# Patient Record
Sex: MALE | Race: WHITE | Employment: OTHER | URBAN - METROPOLITAN AREA
[De-identification: names, ages, dates, MRNs, and addresses within clinical notes are randomized per-mention and may not be internally consistent; named-entity substitution may affect disease eponyms.]

---

## 2021-05-25 ENCOUNTER — TELEPHONE (OUTPATIENT)
Dept: ORTHOPEDIC SURGERY | Age: 86
End: 2021-05-25

## 2021-05-25 NOTE — TELEPHONE ENCOUNTER
Appointment Request     Patient requesting earlier appointment: Yes  Appointment offered to patient: 07/02/2021  Patient Contact Number: [phone]      Patient states first two fingers arm numb and he has burning sensation in hand.

## 2021-06-10 ENCOUNTER — OFFICE VISIT (OUTPATIENT)
Dept: ORTHOPEDIC SURGERY | Age: 86
End: 2021-06-10

## 2021-06-10 VITALS — HEIGHT: 64 IN | RESPIRATION RATE: 16 BRPM | WEIGHT: 214 LBS | BODY MASS INDEX: 36.54 KG/M2

## 2021-06-10 DIAGNOSIS — G56.03 CARPAL TUNNEL SYNDROME, BILATERAL: Primary | ICD-10-CM

## 2021-06-10 PROCEDURE — 99203 OFFICE O/P NEW LOW 30 MIN: CPT | Performed by: ORTHOPAEDIC SURGERY

## 2021-06-10 NOTE — PROGRESS NOTES
This 80 y.o., right hand dominant retired man is seen in referral for Riley Soto MD with a chief complaint of numbness and tingling of the bilateral hands, R>L. Symptoms have been present for several years. The patient also frequently notices pain in the hand, wrist and arm, as well as weakness of , morning stiffness and swelling as well as difficulty performing functions which require fine touch. Symptoms are sometimes worse at night and can disturb sleep. The problem appears to recently have become worse. Conservative treatment has not been prescribed. There is no history of wrist fracture. There is no history and/or family history of diabetes. There is history of thyroid disease. There is no family history of carpal tunnel syndrome. The pain assessment has been reviewed and is correct as stated. The patient's social history, past medical history, family history, medications, allergies and review of systems, entered 6/10/21, have been reviewed, and dated and are recorded in the chart. On examination the patient is Height: 5' 4\" (162.6 cm) tall and weighs Weight: 214 lb (97.1 kg). Respirations are 18 per minute. The patient is well nourished, is oriented to time and place, demonstrates appropriate mood, mild dementia, as well as normal gait and station. Cervical range of motion is normal for the patient's stated age and does not produce pain or paresthesias in either upper extremity. There is soft tissue swelling involving the volar aspect of the bilateral wrist.  There is severe bilateral thenar muscle atrophy. The Tinel sign is positive over the median nerve at the  carpal tunnel bilaterally and negative over the ulnar nerve at the elbow bilaterally. The Phalen test is positive on the right at 0 seconds. There is hypalgesia, with associated dysesthesia, on sensory testing over the median nerve distribution.   Two point discrimination is abnormal at the tip of the right middle finger. Range of motion of the fingers, thumbs and wrists is normal.  Skin is intact without lesions. Distal circulation is normal.  All joints are stable. Fine motor coordination and gross muscle strength are normal. Deep tendon reflexes are brisk and present bilaterally. There are no subcutaneous masses or enlarged epitrochlear lymph nodes. I have personally reviewed and interpreted all previous external imaging studies, laboratory tests, diagnostic proceedures and medical encounters pertinent to this patient's visit today. Review and independent interpretation of an external EMG study, dated 5/7/21 , preformed by Dr. Cee Brown, a physician outside of this office, is abnormal. There is severe right and moderately severe left carpal tunnel syndrome. The test results are shared with the patient and his wife. Impression:     Carpal tunnel syndrome, bilateral, severe, R>L, with clinical  evidence of nerve damage. The nature of this medical problem is fully discussed with the patient and his wife, including all treatment options. All questions are answered. Surgery is also fully discussed including risks, prognosis and post op care. They seem unable to make a decision regarding treatment. Therefore I recommended to them that their son, who is a pharmacist, call me to discuss this problem and medical treatment options.

## 2021-06-14 ENCOUNTER — TELEPHONE (OUTPATIENT)
Dept: ORTHOPEDIC SURGERY | Age: 86
End: 2021-06-14

## 2021-06-22 ENCOUNTER — TELEPHONE (OUTPATIENT)
Dept: ORTHOPEDIC SURGERY | Age: 86
End: 2021-06-22

## 2021-07-23 RX ORDER — TORSEMIDE 20 MG/1
20 TABLET ORAL 2 TIMES DAILY
COMMUNITY

## 2021-07-23 RX ORDER — LEVOTHYROXINE SODIUM 0.03 MG/1
25 TABLET ORAL DAILY
COMMUNITY

## 2021-07-23 RX ORDER — MONTELUKAST SODIUM 10 MG/1
10 TABLET ORAL NIGHTLY
COMMUNITY

## 2021-07-23 RX ORDER — METHSCOPOLAMINE BROMIDE 2.5 MG/1
2.5 TABLET ORAL DAILY
COMMUNITY

## 2021-07-23 RX ORDER — METOPROLOL SUCCINATE 25 MG/1
25 TABLET, EXTENDED RELEASE ORAL 2 TIMES DAILY
COMMUNITY

## 2021-07-23 RX ORDER — PANTOPRAZOLE SODIUM 40 MG/1
40 TABLET, DELAYED RELEASE ORAL DAILY
COMMUNITY

## 2021-07-23 RX ORDER — ESCITALOPRAM OXALATE 10 MG/1
10 TABLET ORAL DAILY
COMMUNITY

## 2021-07-23 RX ORDER — LORATADINE 10 MG/1
10 TABLET ORAL DAILY
COMMUNITY

## 2021-07-23 RX ORDER — FENOFIBRATE 160 MG/1
160 TABLET ORAL DAILY
COMMUNITY

## 2021-07-23 NOTE — PROGRESS NOTES
[facility] time__0815__________        Surgery time____________    Take the following medications with a sip of water: Follow your Doctor's pre procedure instructions regarding medications  Do not eat or drink anything after 12:00 midnight prior to your surgery. This includes water chewing gum, mints and ice chips. You may brush your teeth and gargle the morning of your surgery, but do not swallow the water     Please see your family doctor/pediatrician for a history and physical and/or concerning medications. Bring any test results/reports from your physicians office. If you are under the care of a heart doctor or specialist doctor, please be aware that you may be asked to them for clearance    You may be asked to stop blood thinners such as Coumadin, Plavix, Fragmin, Lovenox, etc., or any anti-inflammatories such as:  Aspirin, Ibuprofen, Advil, Naproxen prior to your surgery. We also ask that you stop any OTC medications such as fish oil, vitamin E, glucosamine, garlic, Multivitamins, COQ 10, etc.    We ask that you do not smoke 24 hours prior to surgery  We ask that you do not  drink any alcoholic beverages 24 hours prior to surgery     You must make arrangements for a responsible adult to take you home after your surgery. For your safety you will not be allowed to leave alone or drive yourself home. Your surgery will be cancelled if you do not have a ride home. Also for your safety, it is strongly suggested that someone stay with you the first 24 hours after your surgery. A parent or legal guardian must accompany a child scheduled for surgery and plan to stay at the hospital until the child is discharged. Please do not bring other children with you. For your comfort, please wear simple loose fitting clothing to the hospital.  Please do not bring valuables.     Do not wear any make-up or nail polish on your fingers or toes      For your safety, please do not wear any jewelry or body piercing's on the day of surgery. All jewelry must be removed. If you have dentures, they will be removed before going to operating room. For your convenience, we will provide you with a container. If you wear contact lenses or glasses, they will be removed, please bring a case for them. If you have a living will and a durable power of  for healthcare, please bring in a copy. As part of our patient safety program to minimize surgical site infections, we ask you to do the following:    · Please notify your surgeon if you develop any illness between         now and the  day of your surgery. · This includes a cough, cold, fever, sore throat, nausea,         or vomiting, and diarrhea, etc.  ·  Please notify your surgeon if you experience dizziness, shortness         of breath or blurred vision between now and the time of your surgery. Do not shave your operative site 96 hours prior to surgery. For face and neck surgery, men may use an electric razor 48 hours   prior to surgery. You may shower the night before surgery or the morning of   your surgery with an antibacterial soap. You will need to bring a photo ID and insurance card    [facility] has an onsite pharmacy, would you like to utilize our pharmacy     If you will be staying overnight and use a C-pap machine, please bring   your C-pap to hospital     Our goal is to provide you with excellent care, therefore, visitors will be limited to two(2) in the room at a time so that we may focus on providing this care for you. Please contact pre-admission testing if you have any further questions. [facility] phone number:  [address] PAT fax number:  [phone]  Please note these are generalized instructions for all surgical cases, you may be provided with more specific instructions according to your surgery.     Preoperative Screening for Elective Surgery/Invasive Procedures While COVID-19 present in the community     Have you tested positive or have been told to self-isolate for COVID-19 like symptoms within the past 28 days? no   Do you currently have any of the following symptoms?no  o Fever >100.0 F or 99.9 F in immunocompromised patients? o New onset cough, shortness of breath or difficulty breathing?  o New onset sore throat, myalgia (muscle aches and pains), headache, loss of taste/smell or diarrhea?  Have you had a potential exposure to COVID-19 within the past 14 days by:  o Close contact with a confirmed case? o Close contact with a healthcare worker,  or essential infrastructure worker (grocery store, [employer], gas station, public utilities or transportation)? o Do you reside in a congregate setting such as; skilled nursing facility, adult home, correctional facility, homeless shelter or other institutional setting?  o Have you had recent travel to a known COVID-19 hotspot? Indicate if the patient has a positive screen by answering yes to one or more of the above questions. Patients who test positive or screen positive prior to surgery or on the day of surgery should be evaluated in conjunction with the surgeon/proceduralist/anesthesiologist to determine the urgency of the procedure. SAFETY FIRST. .call before you fall

## 2021-07-26 ENCOUNTER — ANESTHESIA EVENT (OUTPATIENT)
Dept: OPERATING ROOM | Age: 86
End: 2021-07-26
Payer: MEDICARE

## 2021-07-27 ENCOUNTER — ANESTHESIA (OUTPATIENT)
Dept: OPERATING ROOM | Age: 86
End: 2021-07-27
Payer: MEDICARE

## 2021-07-27 ENCOUNTER — HOSPITAL ENCOUNTER (OUTPATIENT)
Age: 86
Setting detail: OUTPATIENT SURGERY
Discharge: HOME | End: 2021-07-27
Attending: ORTHOPAEDIC SURGERY | Admitting: ORTHOPAEDIC SURGERY
Payer: MEDICARE

## 2021-07-27 VITALS — OXYGEN SATURATION: 99 % | SYSTOLIC BLOOD PRESSURE: 116 MMHG | DIASTOLIC BLOOD PRESSURE: 56 MMHG

## 2021-07-27 VITALS
HEIGHT: 64 IN | RESPIRATION RATE: 16 BRPM | SYSTOLIC BLOOD PRESSURE: 120 MMHG | BODY MASS INDEX: 36.38 KG/M2 | OXYGEN SATURATION: 94 % | HEART RATE: 68 BPM | DIASTOLIC BLOOD PRESSURE: 61 MMHG | WEIGHT: 213.07 LBS | TEMPERATURE: 98 F

## 2021-07-27 PROCEDURE — 3700000000 HC ANESTHESIA ATTENDED CARE: Performed by: ORTHOPAEDIC SURGERY

## 2021-07-27 PROCEDURE — 3700000001 HC ADD 15 MINUTES (ANESTHESIA): Performed by: ORTHOPAEDIC SURGERY

## 2021-07-27 PROCEDURE — 2709999900 HC NON-CHARGEABLE SUPPLY: Performed by: ORTHOPAEDIC SURGERY

## 2021-07-27 PROCEDURE — 2580000003 HC RX 258: Performed by: ORTHOPAEDIC SURGERY

## 2021-07-27 PROCEDURE — 7100000010 HC PHASE II RECOVERY - FIRST 15 MIN: Performed by: ORTHOPAEDIC SURGERY

## 2021-07-27 PROCEDURE — 7100000011 HC PHASE II RECOVERY - ADDTL 15 MIN: Performed by: ORTHOPAEDIC SURGERY

## 2021-07-27 PROCEDURE — 6360000002 HC RX W HCPCS

## 2021-07-27 PROCEDURE — 7100000001 HC PACU RECOVERY - ADDTL 15 MIN: Performed by: ORTHOPAEDIC SURGERY

## 2021-07-27 PROCEDURE — 7100000000 HC PACU RECOVERY - FIRST 15 MIN: Performed by: ORTHOPAEDIC SURGERY

## 2021-07-27 PROCEDURE — 3600000015 HC SURGERY LEVEL 5 ADDTL 15MIN: Performed by: ORTHOPAEDIC SURGERY

## 2021-07-27 PROCEDURE — 3600000005 HC SURGERY LEVEL 5 BASE: Performed by: ORTHOPAEDIC SURGERY

## 2021-07-27 PROCEDURE — 2580000003 HC RX 258: Performed by: ANESTHESIOLOGY

## 2021-07-27 PROCEDURE — 2500000003 HC RX 250 WO HCPCS

## 2021-07-27 PROCEDURE — 64721 CARPAL TUNNEL SURGERY: CPT | Performed by: ORTHOPAEDIC SURGERY

## 2021-07-27 PROCEDURE — 2500000003 HC RX 250 WO HCPCS: Performed by: ORTHOPAEDIC SURGERY

## 2021-07-27 RX ORDER — FENTANYL CITRATE 50 UG/ML
25 INJECTION, SOLUTION INTRAMUSCULAR; INTRAVENOUS EVERY 5 MIN PRN
Status: DISCONTINUED | OUTPATIENT
Start: 2021-07-27 | End: 2021-07-27 | Stop reason: HOSPADM

## 2021-07-27 RX ORDER — SODIUM CHLORIDE 9 MG/ML
25 INJECTION, SOLUTION INTRAVENOUS PRN
Status: DISCONTINUED | OUTPATIENT
Start: 2021-07-27 | End: 2021-07-27 | Stop reason: HOSPADM

## 2021-07-27 RX ORDER — SODIUM CHLORIDE 0.9 % (FLUSH) 0.9 %
10 SYRINGE (ML) INJECTION PRN
Status: DISCONTINUED | OUTPATIENT
Start: 2021-07-27 | End: 2021-07-27 | Stop reason: HOSPADM

## 2021-07-27 RX ORDER — PROMETHAZINE HYDROCHLORIDE 25 MG/ML
6.25 INJECTION, SOLUTION INTRAMUSCULAR; INTRAVENOUS
Status: DISCONTINUED | OUTPATIENT
Start: 2021-07-27 | End: 2021-07-27 | Stop reason: HOSPADM

## 2021-07-27 RX ORDER — SODIUM CHLORIDE 0.9 % (FLUSH) 0.9 %
10 SYRINGE (ML) INJECTION EVERY 12 HOURS SCHEDULED
Status: DISCONTINUED | OUTPATIENT
Start: 2021-07-27 | End: 2021-07-27 | Stop reason: HOSPADM

## 2021-07-27 RX ORDER — SODIUM CHLORIDE 9 MG/ML
INJECTION, SOLUTION INTRAVENOUS CONTINUOUS
Status: DISCONTINUED | OUTPATIENT
Start: 2021-07-27 | End: 2021-07-27 | Stop reason: HOSPADM

## 2021-07-27 RX ORDER — LABETALOL HYDROCHLORIDE 5 MG/ML
5 INJECTION, SOLUTION INTRAVENOUS EVERY 10 MIN PRN
Status: DISCONTINUED | OUTPATIENT
Start: 2021-07-27 | End: 2021-07-27 | Stop reason: HOSPADM

## 2021-07-27 RX ORDER — MAGNESIUM HYDROXIDE 1200 MG/15ML
LIQUID ORAL CONTINUOUS PRN
Status: COMPLETED | OUTPATIENT
Start: 2021-07-27 | End: 2021-07-27

## 2021-07-27 RX ORDER — LIDOCAINE HYDROCHLORIDE 20 MG/ML
INJECTION, SOLUTION EPIDURAL; INFILTRATION; INTRACAUDAL; PERINEURAL PRN
Status: DISCONTINUED | OUTPATIENT
Start: 2021-07-27 | End: 2021-07-27 | Stop reason: SDUPTHER

## 2021-07-27 RX ORDER — PROPOFOL 10 MG/ML
INJECTION, EMULSION INTRAVENOUS CONTINUOUS PRN
Status: DISCONTINUED | OUTPATIENT
Start: 2021-07-27 | End: 2021-07-27 | Stop reason: SDUPTHER

## 2021-07-27 RX ORDER — PROPOFOL 10 MG/ML
INJECTION, EMULSION INTRAVENOUS PRN
Status: DISCONTINUED | OUTPATIENT
Start: 2021-07-27 | End: 2021-07-27 | Stop reason: SDUPTHER

## 2021-07-27 RX ADMIN — PROPOFOL 50 MG: 10 INJECTION, EMULSION INTRAVENOUS at 10:02

## 2021-07-27 RX ADMIN — PROPOFOL 50 MG: 10 INJECTION, EMULSION INTRAVENOUS at 10:05

## 2021-07-27 RX ADMIN — SODIUM CHLORIDE: 9 INJECTION, SOLUTION INTRAVENOUS at 08:56

## 2021-07-27 RX ADMIN — LIDOCAINE HYDROCHLORIDE 100 MG: 20 INJECTION, SOLUTION EPIDURAL; INFILTRATION; INTRACAUDAL; PERINEURAL at 10:02

## 2021-07-27 RX ADMIN — PROPOFOL 180 MCG/KG/MIN: 10 INJECTION, EMULSION INTRAVENOUS at 10:02

## 2021-07-27 ASSESSMENT — PULMONARY FUNCTION TESTS
PIF_VALUE: 0

## 2021-07-27 ASSESSMENT — PAIN - FUNCTIONAL ASSESSMENT: PAIN_FUNCTIONAL_ASSESSMENT: 0-10

## 2021-07-27 NOTE — ANESTHESIA PRE PROCEDURE
[facility] Department of Anesthesiology  Pre-Anesthesia Evaluation/Consultation       Name:  Clay De Oliveira  : 10/14/1933  Age:  80 y.o. MRN:  [id number]  Date: 2021           Surgeon: Surgeon(s):  Julee Aponte MD    Procedure: Procedure(s):  RIGHT CARPAL TUNNEL RELEASE     Allergies   Allergen Reactions    Floxin [Ofloxacin] Other (See Comments)     Oma Sacks had heart attack , dyspnea due to medicine     There is no problem list on file for this patient. Past Medical History:   Diagnosis Date    Anesthesia complication     muscle aches    Asthma     CAD (coronary artery disease)     MI     Hyperlipidemia     Hypertension      Past Surgical History:   Procedure Laterality Date    CATARACT REMOVAL Bilateral     Phaco w/ IOL    EYE SURGERY      orbital fx repair    HERNIA REPAIR Bilateral     inguinal     Social History     Tobacco Use    Smoking status: Never Smoker    Smokeless tobacco: Never Used   Vaping Use    Vaping Use: Never used   Substance Use Topics    Alcohol use: Never    Drug use: Never     Medications  No current facility-administered medications on file prior to encounter.      Current Outpatient Medications on File Prior to Encounter   Medication Sig Dispense Refill    levothyroxine (SYNTHROID) 25 MCG tablet Take 25 mcg by mouth Daily      mometasone-formoterol (DULERA) 200-5 MCG/ACT inhaler Inhale 2 puffs into the lungs every 12 hours      escitalopram (LEXAPRO) 10 MG tablet Take 10 mg by mouth daily      montelukast (SINGULAIR) 10 MG tablet Take 10 mg by mouth nightly      metoprolol succinate (TOPROL XL) 25 MG extended release tablet Take 25 mg by mouth 2 times daily      torsemide (DEMADEX) 20 MG tablet Take 20 mg by mouth 2 times daily      fenofibrate (TRIGLIDE) 160 MG tablet Take 160 mg by mouth daily      pantoprazole (PROTONIX) 40 MG tablet Take 40 mg by mouth daily      loratadine (CLARITIN) 10 MG tablet Take 10 mg by mouth daily      Pseudoephedrine-guaiFENesin (MUCINEX D PO) Take 400 mg by mouth 3 times daily Patients wife stated patient takes 3 times daily      Calcium Carbonate (CALCIUM-CARB 600 PO) Take 1 tablet by mouth daily With vitamin d-3      methscopolamine (PAMINE FORTE) 2.5 MG tablet Take 2.5 mg by mouth daily      therapeutic multivitamin-minerals (THERAGRAN-M) tablet Take 1 tablet by mouth daily.  fluticasone (VERAMYST) 27.5 MCG/SPRAY nasal spray 2 sprays by Nasal route daily.  azelastine (ASTELIN) 137 MCG/SPRAY nasal spray 1 spray by Nasal route 2 times daily. Use in each nostril as directed      fluocinonide (LIDEX) 0.05 % cream Apply  topically 5 times daily. Apply topically 2 times daily. Indications: on tongue for viral infection 2013      albuterol (VENTOLIN HFA) 108 (90 BASE) MCG/ACT inhaler Inhale 2 puffs into the lungs every 6 hours as needed.  aspirin 81 MG chewable tablet Take 81 mg by mouth daily.  losartan (COZAAR) 100 MG tablet Take 100 mg by mouth daily.        Current Facility-Administered Medications   Medication Dose Route Frequency Provider Last Rate Last Admin    0.9 % sodium chloride infusion   Intravenous Continuous Merlinda Pollen, MD        sodium chloride flush 0.9 % injection 10 mL  10 mL Intravenous 2 times per day Merlinda Pollen, MD        sodium chloride flush 0.9 % injection 10 mL  10 mL Intravenous PRN Merlinda Pollen, MD        0.9 % sodium chloride infusion  25 mL Intravenous PRN Merlinda Pollen, MD         Vital Signs (Current)   Vitals:    21   BP: (!) 152/79   Pulse: 88   Resp: 18   Temp: 96.9 °F (36.1 °C)   SpO2: 94%     Vital Signs Statistics (for past 48 hrs)     Temp  Av.9 °F (36.1 °C)  Min: 96.9 °F (36.1 °C)   Min taken time: 21  Max: 96.9 °F (36.1 °C)   Max taken time: 2146  Pulse  Av  Min: 88   Min taken time: 21  Max: 88   Max taken time: 21  Resp  Av  Min: 18   Min taken time: 21 0846  Max: 25   Max taken time: 21 0846  BP  Min: 152/79   Min taken time: 21 0846  Max: 152/79   Max taken time: 21 0846  SpO2  Av %  Min: 94 %   Min taken time: 21 0846  Max: 94 %   Max taken time: 21 0846    BP Readings from Last 3 Encounters:   21 (!) 152/79   13 125/76     BMI  Body mass index is 36.57 kg/m². Estimated body mass index is 36.57 kg/m² as calculated from the following:    Height as of this encounter: 5' 4\" (1.626 m). Weight as of this encounter: 213 lb 1.2 oz (96.6 kg). CBC No results found for: WBC, RBC, HGB, HCT, MCV, RDW, PLT  CMP  No results found for: NA, K, CL, CO2, BUN, CREATININE, GFRAA, AGRATIO, LABGLOM, GLUCOSE, PROT, CALCIUM, BILITOT, ALKPHOS, AST, ALT  BMP  No results found for: NA, K, CL, CO2, BUN, CREATININE, CALCIUM, GFRAA, LABGLOM, GLUCOSE  POCGlucose  No results for input(s): GLUCOSE in the last 72 hours.    Coags  No results found for: PROTIME, INR, APTT  HCG (If Applicable) No results found for: PREGTESTUR, PREGSERUM, HCG, HCGQUANT   ABGs No results found for: PHART, PO2ART, QAS2AZG, IZS1AGN, BEART, G4BMZSEU   Type & Screen (If Applicable)  No results found for: LABABO, LABRH                         BMI: Wt Readings from Last 3 Encounters:       NPO Status:   Date of last liquid consumption: 21   Time of last liquid consumption:    Date of last solid food consumption: 21      Time of last solid consumption:        Anesthesia Evaluation  Patient summary reviewed no history of anesthetic complications:   Airway: Mallampati: III  TM distance: >3 FB   Neck ROM: full   Dental:    (+) partials      Pulmonary:normal exam    (+) asthma:                            Cardiovascular:  Exercise tolerance: good (>4 METS),   (+) hypertension:, past MI:, CAD:, CABG/stent (stent ):, CHF (EF 60):,       ECG reviewed  Rhythm: regular  Rate: normal  Echocardiogram reviewed         Beta Blocker:  Dose

## 2021-07-27 NOTE — ANESTHESIA POSTPROCEDURE EVALUATION
[facility] Department of Anesthesiology  Post-Anesthesia Note       Name:  Wayne Leal                                  Age:  80 y.o. MRN:  [id number]     Last Vitals & Oxygen Saturation: /61   Pulse 68   Temp 98 °F (36.7 °C) (Oral)   Resp 16   Ht 5' 4\" (1.626 m)   Wt 213 lb 1.2 oz (96.6 kg)   SpO2 94%   BMI 36.57 kg/m²   Patient Vitals for the past 4 hrs:   BP Temp Temp src Pulse Resp SpO2 Height Weight   07/27/21 1100 120/61 98 °F (36.7 °C) Oral 68 16 94 %     07/27/21 1047 124/70   68 18      07/27/21 1037 108/71   69 18 93 %     07/27/21 1032 103/66   58 18 95 %     07/27/21 1027 111/62   69 18 98 %     07/27/21 1022 95/65   71 18 99 %     07/27/21 1021 99/60   70 18 97 %     07/27/21 1019  97.6 °F (36.4 °C) Temporal   97 %     07/27/21 0846 (!) 152/79 96.9 °F (36.1 °C) Temporal 88 18 94 % 5' 4\" (1.626 m) 213 lb 1.2 oz (96.6 kg)       Level of consciousness:  Awake, alert    Respiratory: Respirations easy, no distress. Stable. Cardiovascular: Hemodynamically stable. Hydration: Adequate. PONV: Adequately managed. Post-op pain: Adequately controlled. Post-op assessment: Tolerated anesthetic well without complication. Complications:  None.     Nayeli Paz MD  July 27, 2021   12:42 PM

## 2021-07-27 NOTE — H&P
Pre-operative Update of H&P:    I  have seen & examined . Joe Hennessy related solely to his hand and upper extremity conditions, prior to the scheduled procedure on the date of his surgery. The indications for the planned surgical procedure & and his upper-extremity condition are unchanged.

## 2021-07-27 NOTE — OP NOTE
OPERATIVE REPORT          Patient:  Shefali Piper    YOB: 1933  Date of Service:  7/27/2021   Location:  [address] OR    Preoperative Diagnosis:    Right carpal tunnel syndrome    Postoperative Diagnosis:    Same    Procedure:    Right carpal tunnel release      Surgeon:    James Raines. [place] MD    Surgical Assistant:    MAGALI Stout Assistant    Anesthesia:   Local with Sedation    Blood Loss:   Minimal    Complications:  None    Tourniquet Time: 3 minutes     Indications:  Mr. Shefali Piper  is a 80y.o. year-old male with Right carpal tunnel syndrome. I have discussed preoperatively with him  the complications, limitations, expectations, alternatives and risks of surgical care, which he has understood. All of his questions have been fully answered, and he has provided written informed consent to proceed. Procedure:   After written consent was obtained and the proper operative site was identified and marked, Mr. Shefali Piper was brought to the operating room, placed in the supine position on the operating room table with the Right arm extended upon a hand table. Under an appropriate level of sedation, local anesthetic (1% Lidocaine and 1/2% Marcaine both without Epinephrine) was instilled in the planned surgical field. His Right upper extremity was prepped and draped in the usual sterile fashion. After Esmarch exsanguination, the pneumotourniquet was inflated to 250 mm of mercury. A 2 cm longitudinal incision was fashioned at the base of the palm, paralleling the longitudinal thenar crease. Dissection was carried carefully through the subcutaneous tissue identifying and protecting the neurovascular structures. The palmar fascia was incised longitudinally, exposing the transverse carpal ligament. The transverse carpal ligament was incised from its proximal to distal most extent, under direct visualization.  The terminal 2 cm of antebrachial fascia was similarly incised under direct visualization. The contents of the carpal tunnel were inspected and found to be free of mass, lesion or other abnormality. Digital palpation revealed no further constriction about the median nerve. The wound was irrigated copiously with sterile saline for irrigation and the pneumotourniquet was deflated after a period of 3 minutes elevation. The fingers were immediately pink & well perfused. Hemostasis was easily obtained with direct pressure and electrocautery and the wound was closed with interrupted sutures. The wound was dressed with adaptic, dry sterile dressings and a bulky soft hand & wrist dressing was applied. Mr. Alesia Doshi  was awakened from light sedation, having tolerated the procedure without apparent complication. He  was returned to the recovery room in stable condition. At the conclusion of the procedure all needle, instrument, and sponge counts were correct. Jose Moon MD   7/27/2021, 10:16 AM

## 2021-07-29 ENCOUNTER — TELEPHONE (OUTPATIENT)
Dept: ORTHOPEDIC SURGERY | Age: 86
End: 2021-07-29

## 2021-07-29 NOTE — TELEPHONE ENCOUNTER
Tried calling patient, phone just keeps ringing. Itching can be from both. Patient can take Benadryl to relieve itching.

## 2021-07-29 NOTE — TELEPHONE ENCOUNTER
General Question     Subject: ITCHING  Patient and /or Facility Request: PATIENT STATES THAT HIS HAND AND ARM IS ITCHING AND HE WANTS TO KNOW IF THIS IS NORMAL PART OF THE HEALING OR IF IT COULD BE A REACTION TO SOMETHING.  Laura Barrientos 272 Number: [phone]

## 2021-08-02 ENCOUNTER — OFFICE VISIT (OUTPATIENT)
Dept: ORTHOPEDIC SURGERY | Age: 86
End: 2021-08-02

## 2021-08-02 VITALS — HEIGHT: 64 IN | BODY MASS INDEX: 36.37 KG/M2 | WEIGHT: 213 LBS | RESPIRATION RATE: 16 BRPM

## 2021-08-02 DIAGNOSIS — Z98.890 STATUS POST CARPAL TUNNEL RELEASE: Primary | ICD-10-CM

## 2021-08-02 PROCEDURE — 99024 POSTOP FOLLOW-UP VISIT: CPT | Performed by: NURSE PRACTITIONER

## 2021-08-02 NOTE — PROGRESS NOTES
Mr. Yolanda King returns today in follow-up of his recent right Carpal Tunnel Release done approximately 6 days ago. He has done well noting mild discomfort and no other reported complications. He notes pre-operative symptoms to be improved at this time. Patient reports he has tingling in his thumb, index, middle finger and part of his ring finger. Physical Exam:  Skin incision is healing well, without erythema, drainage or sign of infection. Digital range of motion is Full and equal bilaterally. Wrist range of motion is Full and equal bilaterally. Sensation is improved from preoperatvely  Vascular examination reveals normal.  Swelling is minimal.  Sensory and Motor Median Nerve function is intact. Impression:  Mr. Yolanda King is doing well after recent right Carpal Tunnel Release. Plan:  Mr. Yolanda King is instructed in work on Active & Passive range of motion of the digits, wrist, & elbow. These modalities were specifically demonstrated to him today. We discussed the appropriateness of gradual resumption of use of the operated hand and the return to normal use as comfort allows. He is given instructions regarding management of the fresh surgical incision and progressive use of desensitization and tissue massage techniques. We discussed the appropriate expectations and timeline for symptom improvement. He is provided a written patient instruction sheet titled: Postoperative Instructions After Carpal Tunnel Release. I have asked Mr. Yolanda King to follow-up with me or contact me by telephone over the next 2-4 weeks if his symptoms have not fully resolved or if he has not regained full & painless return of function. He is also specifically instructed to return to the office or call for an appointment sooner if his symptoms are changing or worsening prior to that time.     Arabella Hdez, CHRISTINE - CNP

## 2021-08-02 NOTE — PATIENT INSTRUCTIONS
Postoperative Instructions After Carpal Tunnel Release    Dr. David Oliva. Humberto        1. After bandages are removed one week from surgery, you may chose to wear a small bandage over the incision if you wish, though you do not need to. 2. Keep incision dry until sutures have fully dissolved  or it has been 14 days since your surgery. Thereafter, you may wash with mild soap and water and shower normally. 3. Once your stiches have fully disappeared & skin appears normal, you should begin gently massaging the incision with Vitamin E (may use Vitamin E lotion or contents of Vitamin E capsule). 4. Work hard on motion of the fingers and wrist, straightening each finger fully and bending each finger fully, bending wrist forward and bending wrist backwards. Do not be concerned if you experience discomfort. This will not damage the surgery. 5. You may begin using the hand as it feels comfortable beginning 12-14 days from the day of surgery. You may not feel entirely comfortable gripping or lifting heavy objects for several weeks. 6. You may expect to see some skin peel off around the incision. You may be left with a small area of pink baby skin. This is quite normal.    Thank you for choosing [facility] Physicians for your Hand and Upper Extremity needs. If we can be of any further assistance to you, please do not hesitate to contact us.     Office Phone Number:  [phone]  or  [phone]

## 2023-09-19 ENCOUNTER — TELEPHONE (OUTPATIENT)
Dept: ORTHOPEDIC SURGERY | Age: 88
End: 2023-09-19

## 2023-09-19 NOTE — TELEPHONE ENCOUNTER
Called Patient, Wife answered the phone, Patient needs to see Dr. Martha Cox for possible sx before I can put in the surgery letter. Made patient an appointment for this Thursday 9/21/23 at 2:30pm at the [facility] office.

## 2023-09-21 ENCOUNTER — OFFICE VISIT (OUTPATIENT)
Dept: ORTHOPEDIC SURGERY | Age: 88
End: 2023-09-21

## 2023-09-21 VITALS — HEIGHT: 64 IN | RESPIRATION RATE: 15 BRPM | WEIGHT: 213 LBS | BODY MASS INDEX: 36.37 KG/M2

## 2023-09-21 DIAGNOSIS — G56.02 CARPAL TUNNEL SYNDROME OF LEFT WRIST: Primary | ICD-10-CM

## 2023-09-21 NOTE — PROGRESS NOTES
We last examined this patient 2 years ago at which time Dr. Parmjit Wilcox released his right carpal tunnel. He obtained prompt and complete relief of all symptoms. Unfortunately, the condition has recently occurred in his left hand and the patient returns to the office requesting additional treatment. He complains of pain, swelling, paresthesias and stiffness of the hand for the past several months, causing him to shake the hand incessantly. .  Symptoms have become worse recently. The patient's social history, past medical history, family history, medications, allergies and review of systems have been reviewed, dated 9/21/23 and are recorded in the chart. I have personally examined and reviewed all previous imaging studies, laboratory tests and medical encounters pertinent to this patient's visit today. On physical examination there is moderate volar wrist swelling. There is moderate thenar muscle atrophy. The Tinel sign is positive. The Phalen test is positive. Range of motion of the fingers, thumbs and wrists is full bilaterally. There is hypalgesia in the distribution of the median nerve. Two point discrimination is abnormal at the tip of the left ring finger. Distal circulation is intact. All joints are stable. There are no subcutaneous nodules or enlarged epitrochlear lymph nodes. Left carpal tunnel syndrome, chronic severe    The nature of their medical problem is fully discussed with the patient and his wife, including all treatment options. Surgery is also discussed, including the possible risks, complications, prognosis and postoperative care. All questions are answered. The surgery consent forms are explained and signed. Surgery will be scheduled and the patient is asked to call me if there are any additional questions. The patient understands that the surgery will be done by Dr. Nadia Bazan. The patient is instructed to stop taking aspirin 7 days before surgery.   They will check

## 2023-09-22 ENCOUNTER — TELEPHONE (OUTPATIENT)
Dept: ORTHOPEDIC SURGERY | Age: 88
End: 2023-09-22

## 2023-09-28 ENCOUNTER — TELEPHONE (OUTPATIENT)
Dept: ORTHOPEDIC SURGERY | Age: 88
End: 2023-09-28

## 2023-09-28 NOTE — TELEPHONE ENCOUNTER
Patient wife call back and she just need a call back so that her  Braden Morrison can get schedule for surgery. Please Advise.

## 2023-09-28 NOTE — TELEPHONE ENCOUNTER
General Question     Subject: RETURNING CLL  Patient and /or Facility Request: Iraida Cabral  Contact Number: [phone]    PT WIFE CLLED TO RETURN CLL ADV CAN LV MESS WILL CLL BK AFTER GOES TO ANOTHER DOCTOR APPT TODAY

## 2023-09-28 NOTE — TELEPHONE ENCOUNTER
General Question     Subject: Pt's WIFE CALLED BACK - HUNG UP B BEFORE I COULD FIND A CONTACT IN THE OFFICE   Patient and /or Facility Request: [address] Number:

## 2023-10-09 ENCOUNTER — TELEPHONE (OUTPATIENT)
Dept: ORTHOPEDIC SURGERY | Age: 88
End: 2023-10-09

## 2023-10-13 ENCOUNTER — TELEPHONE (OUTPATIENT)
Dept: ORTHOPEDIC SURGERY | Age: 88
End: 2023-10-13

## 2023-10-13 NOTE — TELEPHONE ENCOUNTER
[employer] ([phone]) and spoke to [facility]. (AOT#[id number]) who states the ID number is incorrect. Patient has a new card. Jenniferdonavan Nellyalvaro was kind enough to give me the new ID number-[id number]. Auth: [employer]  Date: 11/2/2023  Reference # [id number]  Spoke with: Louann Castaneda.   Type of SX: OUTPATIENT  Location: [facility]  CPT: 71515    DX: G56.02  SX area: [facility]  Insurance: Leitha Carrel

## 2023-10-19 RX ORDER — EZETIMIBE 10 MG/1
10 TABLET ORAL DAILY
COMMUNITY

## 2023-10-19 RX ORDER — ATORVASTATIN CALCIUM 10 MG/1
10 TABLET, FILM COATED ORAL DAILY
COMMUNITY

## 2023-10-19 RX ORDER — TRICLOSAN 0.15 ML/100L
1 SOAP TOPICAL DAILY
COMMUNITY

## 2023-10-19 RX ORDER — SPIRONOLACTONE 25 MG/1
25 TABLET ORAL DAILY
COMMUNITY

## 2023-10-31 ENCOUNTER — TELEPHONE (OUTPATIENT)
Dept: ORTHOPEDIC SURGERY | Age: 88
End: 2023-10-31

## 2023-10-31 NOTE — TELEPHONE ENCOUNTER
Spoke with patient's wife. I let her know that he is still scheduled for surgery but we need cardiac clearance. She is going to call the cardiologist to have them put in a letter for surgery.

## 2023-10-31 NOTE — TELEPHONE ENCOUNTER
Other WIFE CALLED TO CONFIRM THAT SURGERY WILL STILL BE ON THUR 11/2/23. PLS CALL TO ADVISE [phone]

## 2023-11-01 ENCOUNTER — TELEPHONE (OUTPATIENT)
Dept: ORTHOPEDIC SURGERY | Age: 88
End: 2023-11-01

## 2023-11-01 ENCOUNTER — ANESTHESIA EVENT (OUTPATIENT)
Dept: OPERATING ROOM | Age: 88
End: 2023-11-01
Payer: MEDICARE

## 2023-11-01 NOTE — TELEPHONE ENCOUNTER
Left a vm for wife. Contacts have to come out but the doctor will not remove contacts. Also we need to know about cardiac clearance, still do not see it in his chart.       Cornell Bartlett is not on his contact list.

## 2023-11-01 NOTE — TELEPHONE ENCOUNTER
General Question     Subject: EYE CONTACT   Patient and /or Facility Request: mihir Michelle  Contact Number: [phone]    LEONEL CALLING IN AGAIN STATING THAT THE CONTACT IS SOME KIND OF HEALING CONTACT FOR THE EYE AND STATES THEY THEY CALLED TO THE OPHTHALMOLOGIST AND ASKED IF IT WOULD BE OKAY STAYING IN AND THEY SAID YES     LEONEL IS REQUESTING A CALL BACK FROM SOMEONE IN DR JIMMY KING OFFICE AS SOON AS POSSIBLE FOR CLARIFICATION     PLEASE CALL LEONEL [employer] Or [phone]

## 2023-11-01 NOTE — TELEPHONE ENCOUNTER
General Question     Subject: PRE OP QUESTIONS  Patient and /or Facility Request: Kevin Banuelos  Contact Number: [phone]    PATIENTS DAUGHTER IN LAW CALLED IN ASKING FOR SOMEONE FROM THE [employer] TEAM TO CALL TO CLARIFY IF THE PATIENT NEEDS TO REMOVE CONTACT FROM HIS EYE BEFORE SURGERY. STATES THE OPTOMOLOGIST Godwin CHRISTIANSON SAID IT IS FINE TO KEEP IT IN DURING THE SURGERY. LEONEL DOES NOT WANT BORIS TO ARRIVE TO SURGERY AND BE TURNED AWAY DUE TO THE CONTACT     SHE WANTS TO KNOW IF THE CONTACT HAS TO BE REMOVED SHOULD SHE HAVE IT REMOVED BY THE DOCTOR OR RESCHEDULE THE SX.     PATIENT WOULD LIKE TO KNOW IF IT NEEDS TO BE REMOVED CAN SOMEONE IN THE PRE OP TEAM REMOVE IT TOMORROW BEFORE SURGERY      PLEASE CONTACT LEONEL [phone]

## 2023-11-01 NOTE — TELEPHONE ENCOUNTER
Spoke with patients wife and let her know that if her opthalmologist says it is okay for rory to have that contact in during surgery then it can stay in his eye.

## 2023-11-02 ENCOUNTER — HOSPITAL ENCOUNTER (OUTPATIENT)
Age: 88
Setting detail: OUTPATIENT SURGERY
Discharge: HOME | End: 2023-11-02
Attending: ORTHOPAEDIC SURGERY | Admitting: ORTHOPAEDIC SURGERY
Payer: MEDICARE

## 2023-11-02 ENCOUNTER — ANESTHESIA (OUTPATIENT)
Dept: OPERATING ROOM | Age: 88
End: 2023-11-02
Payer: MEDICARE

## 2023-11-02 VITALS
HEART RATE: 81 BPM | SYSTOLIC BLOOD PRESSURE: 150 MMHG | WEIGHT: 222.66 LBS | HEIGHT: 64 IN | TEMPERATURE: 97.4 F | OXYGEN SATURATION: 98 % | RESPIRATION RATE: 16 BRPM | BODY MASS INDEX: 38.01 KG/M2 | DIASTOLIC BLOOD PRESSURE: 95 MMHG

## 2023-11-02 PROBLEM — G56.02 LEFT CARPAL TUNNEL SYNDROME: Status: ACTIVE | Noted: 2023-11-02

## 2023-11-02 LAB
GLUCOSE BLD-MCNC: 100 MG/DL (ref 70–99)
PERFORMED ON: ABNORMAL

## 2023-11-02 PROCEDURE — 7100000000 HC PACU RECOVERY - FIRST 15 MIN: Performed by: ORTHOPAEDIC SURGERY

## 2023-11-02 PROCEDURE — 7100000011 HC PHASE II RECOVERY - ADDTL 15 MIN: Performed by: ORTHOPAEDIC SURGERY

## 2023-11-02 PROCEDURE — 6360000002 HC RX W HCPCS: Performed by: NURSE ANESTHETIST, CERTIFIED REGISTERED

## 2023-11-02 PROCEDURE — 2500000003 HC RX 250 WO HCPCS: Performed by: ORTHOPAEDIC SURGERY

## 2023-11-02 PROCEDURE — 2709999900 HC NON-CHARGEABLE SUPPLY: Performed by: ORTHOPAEDIC SURGERY

## 2023-11-02 PROCEDURE — 2500000003 HC RX 250 WO HCPCS: Performed by: NURSE ANESTHETIST, CERTIFIED REGISTERED

## 2023-11-02 PROCEDURE — 3600000005 HC SURGERY LEVEL 5 BASE: Performed by: ORTHOPAEDIC SURGERY

## 2023-11-02 PROCEDURE — 6360000002 HC RX W HCPCS: Performed by: ORTHOPAEDIC SURGERY

## 2023-11-02 PROCEDURE — 2580000003 HC RX 258: Performed by: ANESTHESIOLOGY

## 2023-11-02 PROCEDURE — 7100000001 HC PACU RECOVERY - ADDTL 15 MIN: Performed by: ORTHOPAEDIC SURGERY

## 2023-11-02 PROCEDURE — A4217 STERILE WATER/SALINE, 500 ML: HCPCS | Performed by: ORTHOPAEDIC SURGERY

## 2023-11-02 PROCEDURE — 7100000010 HC PHASE II RECOVERY - FIRST 15 MIN: Performed by: ORTHOPAEDIC SURGERY

## 2023-11-02 PROCEDURE — 2580000003 HC RX 258: Performed by: ORTHOPAEDIC SURGERY

## 2023-11-02 PROCEDURE — 3700000000 HC ANESTHESIA ATTENDED CARE: Performed by: ORTHOPAEDIC SURGERY

## 2023-11-02 RX ORDER — SODIUM CHLORIDE 9 MG/ML
INJECTION, SOLUTION INTRAVENOUS PRN
Status: DISCONTINUED | OUTPATIENT
Start: 2023-11-02 | End: 2023-11-02 | Stop reason: HOSPADM

## 2023-11-02 RX ORDER — SODIUM CHLORIDE 0.9 % (FLUSH) 0.9 %
5-40 SYRINGE (ML) INJECTION EVERY 12 HOURS SCHEDULED
Status: DISCONTINUED | OUTPATIENT
Start: 2023-11-02 | End: 2023-11-02 | Stop reason: HOSPADM

## 2023-11-02 RX ORDER — MAGNESIUM HYDROXIDE 1200 MG/15ML
LIQUID ORAL CONTINUOUS PRN
Status: COMPLETED | OUTPATIENT
Start: 2023-11-02 | End: 2023-11-02

## 2023-11-02 RX ORDER — SODIUM CHLORIDE 0.9 % (FLUSH) 0.9 %
5-40 SYRINGE (ML) INJECTION PRN
Status: DISCONTINUED | OUTPATIENT
Start: 2023-11-02 | End: 2023-11-02 | Stop reason: HOSPADM

## 2023-11-02 RX ORDER — ONDANSETRON 2 MG/ML
4 INJECTION INTRAMUSCULAR; INTRAVENOUS
Status: DISCONTINUED | OUTPATIENT
Start: 2023-11-02 | End: 2023-11-02 | Stop reason: HOSPADM

## 2023-11-02 RX ORDER — PROPOFOL 10 MG/ML
INJECTION, EMULSION INTRAVENOUS PRN
Status: DISCONTINUED | OUTPATIENT
Start: 2023-11-02 | End: 2023-11-02 | Stop reason: SDUPTHER

## 2023-11-02 RX ORDER — FENTANYL CITRATE 0.05 MG/ML
25 INJECTION, SOLUTION INTRAMUSCULAR; INTRAVENOUS EVERY 5 MIN PRN
Status: DISCONTINUED | OUTPATIENT
Start: 2023-11-02 | End: 2023-11-02 | Stop reason: HOSPADM

## 2023-11-02 RX ORDER — LIDOCAINE HYDROCHLORIDE 20 MG/ML
INJECTION, SOLUTION EPIDURAL; INFILTRATION; INTRACAUDAL; PERINEURAL PRN
Status: DISCONTINUED | OUTPATIENT
Start: 2023-11-02 | End: 2023-11-02 | Stop reason: SDUPTHER

## 2023-11-02 RX ORDER — MEPERIDINE HYDROCHLORIDE 25 MG/ML
12.5 INJECTION INTRAMUSCULAR; INTRAVENOUS; SUBCUTANEOUS EVERY 5 MIN PRN
Status: DISCONTINUED | OUTPATIENT
Start: 2023-11-02 | End: 2023-11-02 | Stop reason: HOSPADM

## 2023-11-02 RX ORDER — FENTANYL CITRATE 0.05 MG/ML
50 INJECTION, SOLUTION INTRAMUSCULAR; INTRAVENOUS EVERY 5 MIN PRN
Status: DISCONTINUED | OUTPATIENT
Start: 2023-11-02 | End: 2023-11-02 | Stop reason: HOSPADM

## 2023-11-02 RX ADMIN — PROPOFOL 20 MG: 10 INJECTION, EMULSION INTRAVENOUS at 10:35

## 2023-11-02 RX ADMIN — LIDOCAINE HYDROCHLORIDE 60 MG: 20 INJECTION, SOLUTION EPIDURAL; INFILTRATION; INTRACAUDAL; PERINEURAL at 10:35

## 2023-11-02 RX ADMIN — PROPOFOL 50 MCG/KG/MIN: 10 INJECTION, EMULSION INTRAVENOUS at 10:38

## 2023-11-02 RX ADMIN — SODIUM CHLORIDE: 9 INJECTION, SOLUTION INTRAVENOUS at 10:08

## 2023-11-02 ASSESSMENT — PAIN - FUNCTIONAL ASSESSMENT: PAIN_FUNCTIONAL_ASSESSMENT: NONE - DENIES PAIN

## 2023-11-02 NOTE — PROGRESS NOTES
10/14/1933 DOS   Dr Guillermo Diaz office requested cardiac clearance from Dr Herber Hernandez  10/2. Nothing back yet in Epic  10/19 @ [facility]: TEam`s message sent to [facility] , she is looking into cardiac cl and there is a note from Dr Herber Hernandez stating pt is cancelling surgery. [facility] is looking into this and will get back to me. UPDATE 10/31/23 1013: Latest note says that the wife is calling Dr. Herber Hernandez at [facility] to get cardiac clearance. Teams message sent to [facility] to see if this procedure is going to go on planned as scheduled . Still need Cardiac clearance. UPDATE 10/31/23 1600- [facility] called back - Pt.  Is seeing Dr. Herber Hernandez at [facility] on 23      UPDATE:23: [address]
Patient arrives to PACU at this time with no signs or symptoms of acute distress. Patient denies any pain.  VSS on 2L per [place].
Patient care complete in PACU. Denies pain. VSS. Ok to move to Phase II.
[facility] Pre-Admission Testing Electronic Communication Worksheet for OR/ENDO Procedures        Patient: Vinny Martinez    DOS: 11/2    Arrival Time: 0900    Surgery Time: 1030    Meds to Bed:  [] YES    [x]  NO    Transportation Confirmed: [x] YES    []  NO    History and Physical:  [x] YES    []  NO  [] N/A  If yes, please list doctor or Urgent Care and date of H&P:     Additional Clearance(Cardiac, Pulmonary, etc):  [x] YES    []  NO  Letter to Dr Coleen Lambert for Clearance 10/2, no answer as of 10/19    Pre-Admission Testing Visit:  [] YES    [x]  NO If no, do labs/testing need to be done DOS?   [] YES    [x]  NO    Medication Reconciliation Complete:  [x] YES    []  NO        Additional Notes:                Interview Complete: [x] YES    []  NO          Skylar Bowles, RN  2:45 PM
time_1030___________    Do not eat or drink anything after 12:00 midnight prior to your surgery. This includes water chewing gum, mints and ice chips- the Day of Surgery. You may brush your teeth and gargle the morning of your surgery, but do not swallow the water  Follow your MD/Surgeons pre-procedure instructions regarding your medications      Please see your family doctor/pediatrician for a history and physical and/or questions concerning medications. Bring any test results/reports from your physicians office. If you are under the care of a heart doctor or specialist doctor, please be aware that you may be asked to them for clearance    You may be asked to stop blood thinners such as Coumadin, Plavix, Fragmin, Lovenox, etc., or any anti-inflammatories such as:  Aspirin, Ibuprofen, Advil, Naproxen prior to your surgery. We also ask that you stop any OTC medications such as fish oil, vitamin E, glucosamine, garlic, Multivitamins, COQ 10, etc.    We ask that you do not smoke 24 hours prior to surgery  We ask that you do not  drink any alcoholic beverages 24 hours prior to surgery     You must make arrangements for a responsible adult to take you home after your surgery. For your safety you will not be allowed to leave alone or drive yourself home. Your surgery will be cancelled if you do not have a ride home. Also for your safety, it is strongly suggested that someone stay with you the first 24 hours after your surgery. A parent or legal guardian must accompany a child scheduled for surgery and plan to stay at the hospital until the child is discharged. Please do not bring other children with you. For your comfort, please wear simple loose fitting clothing to the hospital.  Please do not bring valuables. Do not wear any make-up or nail polish on your fingers or toes. For your safety, please do not wear any jewelry or body piercing's on the day of surgery. All jewelry must be removed.

## 2023-11-02 NOTE — DISCHARGE INSTRUCTIONS
Post-Operative Instructions    Carpal Tunnel Release:    Keep hand elevated with fingers above eye-level to control swelling. Keep hand and bandage clean and dry. Do not change or unwrap bandage. Please leave bandage in place until your follow-up appointment. Maintain finger motion by fully straightening and fully bending fingers and thumb at least once an hour (while awake). This may cause some discomfort, but will not damage surgery. You may use your operated hand for lightweight tasks (e.g. writing, eating, dressing, etc.). NO LIFTING, CARRYING OR HEAVY USE. Most Patients do not have \"Serious Pain\" after this procedure and thus most patients do not require prescription pain medication. You may take over the counter medication (Tylenol, Advil, Aleve, etc.) as needed. If you are unable to tolerate the discomfort after your surgery and the OTC medications do not provide some relief, you may contact our office to discuss other options. .    Please call the office at [phone]  in 24 - 48 hours to schedule a follow up appointment for approximately 7 - 10 days after surgery. Please call the office at [phone]  if you have any questions or problems. Cherylene Boyers Fatima Bond, MD

## 2023-11-02 NOTE — ANESTHESIA PRE PROCEDURE
[facility] Department of Anesthesiology  Pre-Anesthesia Evaluation/Consultation       Name:  Lorraine Lawler  : 10/14/1933  Age:  80 y.o. MRN:  [id number]  Date: 2023           Surgeon: Surgeon(s): Dory Cheek MD    Procedure: Procedure(s):  LEFT CARPAL TUNNEL RELEASE     Allergies   Allergen Reactions    Amlodipine     Floxin [Ofloxacin] Other (See Comments)     Thinks had heart attack , dyspnea due to medicine    Lipitor [Atorvastatin]     Motrin [Ibuprofen]     Zocor [Simvastatin]      Patient Active Problem List   Diagnosis    Carpal tunnel syndrome       Past Medical History:   Diagnosis Date    Anesthesia complication     muscle aches    Asthma     CAD (coronary artery disease)     MI     Hyperlipidemia     Hypertension      Past Surgical History:   Procedure Laterality Date    CARPAL TUNNEL RELEASE Right 2021    RIGHT CARPAL TUNNEL RELEASE performed by Dory Cheek MD at [address] Bilateral     Phaco w/ IOL    EYE SURGERY      orbital fx repair    HEMORRHOID SURGERY      HERNIA REPAIR Bilateral     inguinal     Social History     Tobacco Use    Smoking status: Never    Smokeless tobacco: Never   Vaping Use    Vaping Use: Never used   Substance Use Topics    Alcohol use: Never    Drug use: Never     Medications  No current facility-administered medications on file prior to encounter.      Current Outpatient Medications on File Prior to Encounter   Medication Sig Dispense Refill    ezetimibe (ZETIA) 10 MG tablet Take 1 tablet by mouth daily      vitamin D 25 MCG (1000 UT) CAPS 1 capsule daily      Multiple Vitamins-Minerals (HEALTHY EYES) TABS Take 1 tablet by mouth daily      spironolactone (ALDACTONE) 25 MG tablet Take 1 tablet by mouth daily      dapagliflozin (FARXIGA) 10 MG tablet Take 1 tablet by mouth every morning      atorvastatin (LIPITOR) 10 MG tablet Take 1 tablet by mouth daily      NEOMYCIN-POLYMYXIN-DEXAMETH

## 2023-11-02 NOTE — ANESTHESIA POSTPROCEDURE EVALUATION
Department of Anesthesiology  Postprocedure Note    Patient: Jane Amaya  MRN: [id number]  YOB: 1933  Date of evaluation: 11/2/2023      Procedure Summary       Date: 11/02/23 Room / Location: [address]    Anesthesia Start: 1033 Anesthesia Stop: 4350    Procedure: LEFT CARPAL TUNNEL RELEASE (Left: Wrist) Diagnosis:       Left carpal tunnel syndrome      (Left carpal tunnel syndrome [G56.02])    Surgeons: Florentino Meza MD Responsible Provider: Irina Hollins MD    Anesthesia Type: MAC ASA Status: 3            Anesthesia Type: No value filed.     Maritza Phase I: Maritza Score: 10    Maritza Phase II: Maritza Score: 10      Anesthesia Post Evaluation    Patient location during evaluation: PACU  Patient participation: complete - patient participated  Level of consciousness: awake and alert  Pain score: 0  Airway patency: patent  Nausea & Vomiting: no nausea and no vomiting  Complications: no  Cardiovascular status: blood pressure returned to baseline  Respiratory status: acceptable  Hydration status: euvolemic  Pain management: adequate

## 2023-11-02 NOTE — OP NOTE
OPERATIVE REPORT          Patient:  Vinny Martinez    YOB: 1933  Date of Service:  11/2/2023   Location:  [address] [place] [address] OR    Preoperative Diagnosis:    Left carpal tunnel syndrome    Postoperative Diagnosis:    Same    Procedure:    Left carpal tunnel release      Surgeon:    Sirena Hinton. Sangeeta Phillip MD    Surgical Assistant:    Temo Palmer PA-C    Anesthesia:   Local with Sedation    Blood Loss:   Minimal    Complications:  None    Tourniquet Time: 2 minutes     Indications:  Mr. Vinny Martinez  is a 80y.o. year-old male with Left carpal tunnel syndrome. I have discussed preoperatively with him  the complications, limitations, expectations, alternatives and risks of surgical care, which he has understood. All of his questions have been fully answered, and he has provided written informed consent to proceed. Procedure:   After written consent was obtained and the proper operative site was identified and marked, Mr. Vinny Martinez was brought to the operating room, placed in the supine position on the operating room table with the Left arm extended upon a hand table. Under an appropriate level of sedation, local anesthetic (1% Lidocaine and 1/2% Marcaine both without Epinephrine) was instilled in the planned surgical field. His Left upper extremity was prepped and draped in the usual sterile fashion. After Esmarch exsanguination, the pneumotourniquet was inflated to 250 mm of mercury. A 2 cm longitudinal incision was fashioned at the base of the palm, paralleling the longitudinal thenar crease. Dissection was carried carefully through the subcutaneous tissue identifying and protecting the neurovascular structures. The palmar fascia was incised longitudinally, exposing the transverse carpal ligament. The transverse carpal ligament was incised from its proximal to distal most extent, under direct visualization.  The terminal 2 cm of antebrachial fascia was similarly incised under

## 2023-11-02 NOTE — H&P
Pre-operative Update of H&P:    I  have seen & examined . Yassine Suárez related solely to his hand and upper extremity conditions, prior to the scheduled procedure on the date of his surgery. The indications for the planned surgical procedure & and his upper-extremity condition are unchanged.

## 2023-11-10 ENCOUNTER — OFFICE VISIT (OUTPATIENT)
Dept: ORTHOPEDIC SURGERY | Age: 88
End: 2023-11-10

## 2023-11-10 VITALS — RESPIRATION RATE: 16 BRPM | BODY MASS INDEX: 37.9 KG/M2 | WEIGHT: 222 LBS | HEIGHT: 64 IN

## 2023-11-10 DIAGNOSIS — Z98.890 STATUS POST CARPAL TUNNEL RELEASE: Primary | ICD-10-CM

## 2023-11-10 PROCEDURE — 99024 POSTOP FOLLOW-UP VISIT: CPT | Performed by: ORTHOPAEDIC SURGERY
